# Patient Record
Sex: FEMALE | Race: WHITE
[De-identification: names, ages, dates, MRNs, and addresses within clinical notes are randomized per-mention and may not be internally consistent; named-entity substitution may affect disease eponyms.]

---

## 2017-06-11 ENCOUNTER — HOSPITAL ENCOUNTER (EMERGENCY)
Dept: HOSPITAL 45 - C.EDB | Age: 62
Discharge: HOME | End: 2017-06-11
Payer: COMMERCIAL

## 2017-06-11 VITALS
HEIGHT: 60 IN | BODY MASS INDEX: 23.42 KG/M2 | BODY MASS INDEX: 23.42 KG/M2 | WEIGHT: 119.27 LBS | HEIGHT: 60 IN | WEIGHT: 119.27 LBS

## 2017-06-11 VITALS — HEART RATE: 87 BPM | SYSTOLIC BLOOD PRESSURE: 136 MMHG | OXYGEN SATURATION: 100 % | DIASTOLIC BLOOD PRESSURE: 96 MMHG

## 2017-06-11 VITALS — TEMPERATURE: 98.06 F

## 2017-06-11 DIAGNOSIS — D64.9: ICD-10-CM

## 2017-06-11 DIAGNOSIS — Z79.899: ICD-10-CM

## 2017-06-11 DIAGNOSIS — E03.9: ICD-10-CM

## 2017-06-11 DIAGNOSIS — M35.00: ICD-10-CM

## 2017-06-11 DIAGNOSIS — M54.6: Primary | ICD-10-CM

## 2017-06-11 LAB
ALBUMIN/GLOB SERPL: 1.2 {RATIO} (ref 0.9–2)
ALP SERPL-CCNC: 106 U/L (ref 45–117)
ALT SERPL-CCNC: 27 U/L (ref 12–78)
ANION GAP SERPL CALC-SCNC: 8 MMOL/L (ref 3–11)
AST SERPL-CCNC: 20 U/L (ref 15–37)
BASOPHILS # BLD: 0.02 K/UL (ref 0–0.2)
BASOPHILS NFR BLD: 0.2 %
BUN SERPL-MCNC: 9 MG/DL (ref 7–18)
BUN/CREAT SERPL: 13.6 (ref 10–20)
CALCIUM SERPL-MCNC: 8.6 MG/DL (ref 8.5–10.1)
CHLORIDE SERPL-SCNC: 105 MMOL/L (ref 98–107)
CO2 SERPL-SCNC: 29 MMOL/L (ref 21–32)
COMPLETE: YES
CREAT CL PREDICTED SERPL C-G-VRATE: 61.7 ML/MIN
CREAT SERPL-MCNC: 0.67 MG/DL (ref 0.6–1.2)
EOSINOPHIL NFR BLD AUTO: 264 K/UL (ref 130–400)
GLOBULIN SER-MCNC: 3.1 GM/DL (ref 2.5–4)
GLUCOSE SERPL-MCNC: 85 MG/DL (ref 70–99)
HCT VFR BLD CALC: 34.1 % (ref 37–47)
IG%: 0.1 %
IMM GRANULOCYTES NFR BLD AUTO: 19.3 %
LYMPHOCYTES # BLD: 1.66 K/UL (ref 1.2–3.4)
MCH RBC QN AUTO: 31.9 PG (ref 25–34)
MCHC RBC AUTO-ENTMCNC: 33.7 G/DL (ref 32–36)
MCV RBC AUTO: 94.7 FL (ref 80–100)
MONOCYTES NFR BLD: 9.5 %
NEUTROPHILS # BLD AUTO: 1 %
NEUTROPHILS NFR BLD AUTO: 69.9 %
PMV BLD AUTO: 9.1 FL (ref 7.4–10.4)
POTASSIUM SERPL-SCNC: 3.9 MMOL/L (ref 3.5–5.1)
RBC # BLD AUTO: 3.6 M/UL (ref 4.2–5.4)
SODIUM SERPL-SCNC: 142 MMOL/L (ref 136–145)
WBC # BLD AUTO: 8.61 K/UL (ref 4.8–10.8)

## 2017-06-11 NOTE — EMERGENCY ROOM VISIT NOTE
ED Visit Note


First contact with patient:  11:42


Resident Physician Supervision Note:





I interviewed and examined the patient. Discussed with  and agree with 

findings and plan as documented in the note. Any exceptions or clarifications 

are listed here: [None]





Documented By:  Kash Still

## 2017-06-11 NOTE — DIAGNOSTIC IMAGING REPORT
THORACIC SPINE 3 VIEWS ROUTINE



CLINICAL HISTORY: T9-T11 back pain    



COMPARISON STUDY:  No previous studies for comparison.



FINDINGS: There is a minor spinal curvature convex to the left. There are

multilevel degenerative changes most pronounced in the lower thoracic spine.

There are prominent anterior and lateral osteophytes. No acute fractures or

traumatic subluxations are visualized. There are no destructive lesions.



IMPRESSION:  

1. Moderate multilevel degenerative change

2. No acute fractures. No destructive lesions are visualized. 









Electronically signed by:  Peter Driscoll M.D.

6/11/2017 1:49 PM



Dictated Date/Time:  6/11/2017 1:48 PM

## 2017-06-11 NOTE — DIAGNOSTIC IMAGING REPORT
CHEST 1 VW FRONT-NOT PORTABLE



CLINICAL HISTORY: BACK PAIN HISTORY OF PERICARDIAL EFFUSION.



COMPARISON STUDY:  12/4/2011



FINDINGS: The cardiac and mediastinal contours are normal. There is no evidence

of focal pulmonary consolidation. There is no evidence of failure. No pleural

effusions are visualized.[ 



IMPRESSION: No active disease in the chest.







Electronically signed by:  Peter Driscoll M.D.

6/11/2017 1:48 PM



Dictated Date/Time:  6/11/2017 1:47 PM

## 2017-06-11 NOTE — EMERGENCY ROOM VISIT NOTE
History


First contact with patient:  11:42


Chief Complaint:  BACK PAIN


Stated Complaint:  MID BACK PAIN X 7-10 DAYS,WORSE AT NIGHT





History of Present Illness


The patient is a 62 year old female who presents to the Emergency Room with 

complaints of mid thoracic back pain. This pain has been present for the last 7-

10 days, worse on deep breathing and at night when lying down. Worse at night/

lying down (after getting up from falling asleep on sofa) 7/10, currently 3/10. 

Radiation across back both sides. No acid taste in her mouth. No associated 

shortness of breath, diaphoresis or palpitations. She denies any claudication, 

orthopnea or PND. She denies any precipitating trauma however she has recently 

doubled the amount of time she spends on her exercise bike. Motrin helps with 

the pain, she last took this at 12:30am.





She denies any limb weakness, change in sensation, urinary or bowel 

incontinence.





She denies any leg swelling or family history of DVT/PE. She has osteoporosis 

but no known fractures associated with this and was previously on 

bisphosphonates.





Review of Systems


Chronic dry mouth from Sjogren's.


See HPI for pertinent positives & negatives. A total of 10 systems reviewed and 

were otherwise negative.





Past Medical/Surgical History


Medical Problems:


(1) Hypothyroid


(2) Pericardial effusion


(3) Sjoegren syndrome








Social History


Smoking Status:  Never Smoker


Alcohol Use:  occasionally


Marital Status:  





Current/Historical Medications


Scheduled


Cholecalciferol (Vitamin D3), 1,000 INTER.UNIT PO DAILY


Desipramine HCl (Desipramine HCl), 100 MG PO HS


Fluticasone Propionate (Nasal) (Flonase Allergy Relief), 1 SPRAY NA DAILY


Hydroxychloroquine Sulfate (Plaquenil), 200 MG PO DAILY


Krill Oil (Krill Oil 500 mg), 500 MG PO DAILY


Levothyroxine Sodium (Synthroid), 100 MCG PO DAILY


Loratadine (Claritin), 10 MG PO DAILY


Misc Natural Products (Glucosamine Chondroitin T), 1 TAB PO DAILY


Multiple Minerals W/ Vitamins (Calcium Citrate +), 600 MG PO DAILY


Multivitamin (Multivitamin), 1 TAB PO DAILY





Allergies


Coded Allergies:  


     No Known Allergies (Unverified , 6/11/17)





Physical Exam


Vital Signs











  Date Time  Temp Pulse Resp B/P (MAP) Pulse Ox O2 Delivery O2 Flow Rate FiO2


 


6/11/17 15:43  87 22 136/96 100   


 


6/11/17 13:45  86 20 124/87 100 Room Air  


 


6/11/17 13:17  90 14 158/87 99 Room Air  


 


6/11/17 12:06  105      


 


6/11/17 12:01      Room Air  


 


6/11/17 11:57  97 20 131/91 100 Room Air  


 


6/11/17 11:34 36.7 66 16 136/91 96 Room Air  








Pain Rating (0-10):  0





Physical Exam


VITAL SIGNS: were reviewed as above


GENERAL: no acute distress


SKIN: Warm dry and pink, no rashes


HEAD: Normocephalic and atraumatic


EYES: extraocular muscles intact, pupils equal


OROPHARYNX: non erythematous, clear and moist


NECK: Supple, no adenopathy or meningismus


LUNGS: regular rate, clear to auscultation, no crackles or wheezing, no 

accessory muscle use


HEART: Regular rate and rhythm, heart sounds 1+2, no murmurs, no rubs


ABDOMEN: Soft and nontender, bowel sounds normal


BACK: no CVA tenderness


EXTREMITIES: Warm and well perfused, no calf tenderness/swelling, no pedal 

edema.


NEUROLOGICALLY: Awake alert and oriented. There is no facial droop. Speech is 

clear. Vision is grossly normal. Lower limb power and sensation normal.





Medical Decision & Procedures


ER Provider


Diagnostic Interpretation:


CHEST 1 VW FRONT-NOT PORTABLE





CLINICAL HISTORY: BACK PAIN HISTORY OF PERICARDIAL EFFUSION.





COMPARISON STUDY:  12/4/2011





FINDINGS: The cardiac and mediastinal contours are normal. There is no evidence


of focal pulmonary consolidation. There is no evidence of failure. No pleural


effusions are visualized.[ 





IMPRESSION: No active disease in the chest.





Electronically signed by:  Peter Driscoll M.D.


6/11/2017 1:48 PM





Dictated Date/Time:  6/11/2017 1:47 PM





THORACIC SPINE 3 VIEWS ROUTINE





CLINICAL HISTORY: T9-T11 back pain    





COMPARISON STUDY:  No previous studies for comparison.





FINDINGS: There is a minor spinal curvature convex to the left. There are


multilevel degenerative changes most pronounced in the lower thoracic spine.


There are prominent anterior and lateral osteophytes. No acute fractures or


traumatic subluxations are visualized. There are no destructive lesions.





IMPRESSION:  


1. Moderate multilevel degenerative change


2. No acute fractures. No destructive lesions are visualized. 





Electronically signed by:  Peter Driscoll M.D.


6/11/2017 1:49 PM





Dictated Date/Time:  6/11/2017 1:48 PM





Laboratory Results


6/11/17 12:35








Red Blood Count 3.60, Mean Corpuscular Volume 94.7, Mean Corpuscular Hemoglobin 

31.9, Mean Corpuscular Hemoglobin Concent 33.7, Mean Platelet Volume 9.1, 

Neutrophils (%) (Auto) 69.9, Lymphocytes (%) (Auto) 19.3, Monocytes (%) (Auto) 

9.5, Eosinophils (%) (Auto) 1.0, Basophils (%) (Auto) 0.2, Neutrophils # (Auto) 

6.01, Lymphocytes # (Auto) 1.66, Monocytes # (Auto) 0.82, Eosinophils # (Auto) 

0.09, Basophils # (Auto) 0.02





6/11/17 12:35

















Test


  6/11/17


12:35


 


White Blood Count


  8.61 K/uL


(4.8-10.8)


 


Red Blood Count


  3.60 M/uL


(4.2-5.4)


 


Hemoglobin


  11.5 g/dL


(12.0-16.0)


 


Hematocrit 34.1 % (37-47) 


 


Mean Corpuscular Volume


  94.7 fL


()


 


Mean Corpuscular Hemoglobin


  31.9 pg


(25-34)


 


Mean Corpuscular Hemoglobin


Concent 33.7 g/dl


(32-36)


 


Platelet Count


  264 K/uL


(130-400)


 


Mean Platelet Volume


  9.1 fL


(7.4-10.4)


 


Neutrophils (%) (Auto) 69.9 % 


 


Lymphocytes (%) (Auto) 19.3 % 


 


Monocytes (%) (Auto) 9.5 % 


 


Eosinophils (%) (Auto) 1.0 % 


 


Basophils (%) (Auto) 0.2 % 


 


Neutrophils # (Auto)


  6.01 K/uL


(1.4-6.5)


 


Lymphocytes # (Auto)


  1.66 K/uL


(1.2-3.4)


 


Monocytes # (Auto)


  0.82 K/uL


(0.11-0.59)


 


Eosinophils # (Auto)


  0.09 K/uL


(0-0.5)


 


Basophils # (Auto)


  0.02 K/uL


(0-0.2)


 


RDW Standard Deviation


  44.7 fL


(36.4-46.3)


 


RDW Coefficient of Variation


  12.9 %


(11.5-14.5)


 


Immature Granulocyte % (Auto) 0.1 % 


 


Immature Granulocyte # (Auto)


  0.01 K/uL


(0.00-0.02)


 


Anion Gap


  8.0 mmol/L


(3-11)


 


Est Creatinine Clear Calc


Drug Dose 61.7 ml/min 


 


 


Estimated GFR (


American) 108.4 


 


 


Estimated GFR (Non-


American 93.5 


 


 


BUN/Creatinine Ratio 13.6 (10-20) 


 


Calcium Level


  8.6 mg/dl


(8.5-10.1)


 


Total Bilirubin


  0.6 mg/dl


(0.2-1)


 


Aspartate Amino Transf


(AST/SGOT) 20 U/L (15-37) 


 


 


Alanine Aminotransferase


(ALT/SGPT) 27 U/L (12-78) 


 


 


Alkaline Phosphatase


  106 U/L


()


 


Troponin I


  < 0.015 ng/ml


(0-0.045)


 


Total Protein


  6.8 gm/dl


(6.4-8.2)


 


Albumin


  3.7 gm/dl


(3.4-5.0)


 


Globulin


  3.1 gm/dl


(2.5-4.0)


 


Albumin/Globulin Ratio 1.2 (0.9-2) 


 


Lipase


  129 U/L


()











ECG


Indication:  back/shoulder pain


Rate (beats per minute):  88


Rhythm:  normal sinus


Change:


Comparison EKG 03/31/2012


Mild ST elevation no longer present otherwise appears similar





ED Course


11:45am Complete history and physical performed


12:15pm Discussed with Dr Still who agreed with plana and separately performed 

history and physical


After all imaging and labs were back, the results were discussed with the 

patient. She was reassured and discharged.





Medical Decision


Prior records/ancillary studies reviewed.


Triage Nursing notes reviewed.


Additional history obtained from patient





The patient's history was concerning for back pain.





Differential diagnosis:


Etiologies such as musculoskeletal, disc herniation, fracture, aortic disease, 

metastatic disease, cord compression, discitis, infection, renal colic, 

gastrointestinal, acute exacerbation of chronic back pain, sciatica, cauda 

equina, as well as others were entertained.





Physical findings:


As above.  No focal neurologic findings noted.





ER treatment provided:


On reassessment the patient felt better.





Diagnostics interpreted by me:


The labs revealed mild anemia only





Imaging studies:


moderate degenerative changes of thoracic spine but otherwise unremarkable





This appears to be consistent with [facility] thoracic back pain. The patient's 

physical examination and detailed history did not reveal any red flags for back 

pain such as those listed in the differential diagnosis. Therefore advanced 

diagnostics and consultations were felt to be unwarranted. 





By the evaluation outlined above emergent etiologies such as pericardial 

effusion, fracture, aortic disease, metastatic disease, infection, renal colic, 

gastrointestinal, cord compression, cauda equina, as well as others were deemed 

relatively unlikely.





The patient informed about the findings as listed above and advised to reduce 

her exercise regimen, stretch more and follow up with her PCP. All questions 

were answered and she pleased with the investigation. Return instructions were 

outlined and the patient was discharged in stable condition.





Referral:


The patient was referred back to her primary care physician for follow-up as 

previously arranged in 3 days.





Impression





 Primary Impression:  


 Thoracic back pain


 Additional Impression:  


 Anemia





Departure Information


Dispostion


Home / Self-Care





Condition


GOOD





Referrals


Douglas Cary M.D. (PCP)





Patient Instructions


[facility]





Resident Tracking


Resident Involvement:  Resident Care Provided


Care Provided:  Adult ED





Problem Qualifiers








 Primary Impression:  


 Thoracic back pain


 Chronicity:  acute  Back pain laterality:  bilateral  Qualified Codes:  M54.6 

- Pain in thoracic spine


 Additional Impression:  


 Anemia


 Anemia type:  unspecified type  Qualified Codes:  D64.9 - Anemia, unspecified

## 2017-08-01 ENCOUNTER — HOSPITAL ENCOUNTER (OUTPATIENT)
Dept: HOSPITAL 45 - X.SURG | Age: 62
Discharge: HOME | End: 2017-08-01
Attending: OPHTHALMOLOGY
Payer: COMMERCIAL

## 2017-08-01 VITALS
BODY MASS INDEX: 22.43 KG/M2 | BODY MASS INDEX: 22.43 KG/M2 | HEIGHT: 60 IN | WEIGHT: 114.24 LBS | HEIGHT: 60 IN | WEIGHT: 114.24 LBS

## 2017-08-01 VITALS — TEMPERATURE: 98.6 F

## 2017-08-01 VITALS — OXYGEN SATURATION: 100 % | SYSTOLIC BLOOD PRESSURE: 120 MMHG | DIASTOLIC BLOOD PRESSURE: 84 MMHG | HEART RATE: 78 BPM

## 2017-08-01 DIAGNOSIS — E03.9: ICD-10-CM

## 2017-08-01 DIAGNOSIS — Z82.69: ICD-10-CM

## 2017-08-01 DIAGNOSIS — F50.81: ICD-10-CM

## 2017-08-01 DIAGNOSIS — Z82.5: ICD-10-CM

## 2017-08-01 DIAGNOSIS — M19.90: ICD-10-CM

## 2017-08-01 DIAGNOSIS — F32.9: ICD-10-CM

## 2017-08-01 DIAGNOSIS — M35.00: ICD-10-CM

## 2017-08-01 DIAGNOSIS — Z92.29: ICD-10-CM

## 2017-08-01 DIAGNOSIS — M81.0: ICD-10-CM

## 2017-08-01 DIAGNOSIS — H26.9: Primary | ICD-10-CM

## 2017-08-01 RX ADMIN — CYCLOPENTOLATE HYDROCHLORIDE SCH DROP: 10 SOLUTION/ DROPS OPHTHALMIC at 07:45

## 2017-08-01 RX ADMIN — TROPICAMIDE SCH DROP: 10 SOLUTION/ DROPS OPHTHALMIC at 07:46

## 2017-08-01 RX ADMIN — CYCLOPENTOLATE HYDROCHLORIDE SCH DROP: 10 SOLUTION/ DROPS OPHTHALMIC at 07:41

## 2017-08-01 RX ADMIN — KETOROLAC TROMETHAMINE SCH DROP: 5 SOLUTION OPHTHALMIC at 07:47

## 2017-08-01 RX ADMIN — PHENYLEPHRINE HYDROCHLORIDE SCH DROP: 25 SOLUTION/ DROPS OPHTHALMIC at 07:36

## 2017-08-01 RX ADMIN — KETOROLAC TROMETHAMINE SCH DROP: 5 SOLUTION OPHTHALMIC at 07:42

## 2017-08-01 RX ADMIN — GATIFLOXACIN SCH DROP: 5 SOLUTION/ DROPS OPHTHALMIC at 07:43

## 2017-08-01 RX ADMIN — GATIFLOXACIN SCH DROP: 5 SOLUTION/ DROPS OPHTHALMIC at 07:53

## 2017-08-01 RX ADMIN — PHENYLEPHRINE HYDROCHLORIDE SCH DROP: 25 SOLUTION/ DROPS OPHTHALMIC at 07:44

## 2017-08-01 RX ADMIN — TROPICAMIDE SCH DROP: 10 SOLUTION/ DROPS OPHTHALMIC at 07:40

## 2017-08-01 NOTE — [facility] OPERATIVE REPORT
Operative Report


Date of Service


Aug 1, 2017.





Operative Report


1.  PREOPERATIVE DIAGNOSIS:  Cataract of the left eye.





2.  POSTOPERATIVE DIAGNOSIS:  Same.





3.  PROCEDURE:  Phacoemulsification with intraocular lens implantation of the 

left eye.  





SURGEON:  Dr. Matthias Mott.  ANESTHESIA:  Topical Lidocaine gel, 1% Non-

Preserved intracameral Lidocaine, and monitored intravenous sedation.  





INDICATIONS FOR THE PROCEDURE:  The patient is a 62 - year-old female with a 

history of cataract of the left eye causing significant visual impairment.  The 

details of the proposed procedure were explained to the patient who asked 

appropriate questions and following discussion of all risks, benefits and 

alternatives agreed to have the procedure done.  Patient had corneal 

astigmatism and therefore elected to have a toric lens placed.





4.  OPERATION AND FINDINGS:  





DESCRIPTION OF PROCEDURE:  After informed consent was obtained,patient was 

placed in an upright position and the cornea was marked at 62 degrees using the 

[employer] corneal marking tool. The the patient was brought to the Operating Room 

at the [facility].  The patient was placed in a supine 

position and then the left eye was prepped and draped in the usual sterile 

fashion for intraocular surgery.  A drop of topical Lidocaine gel was placed in 

the operative eye.  A wire lid speculum was then placed in the fornices.  A 

corneal paracentesis was then created temporally.  The Non-Preserved Lidocaine 

was then instilled into the anterior chamber.  The anterior chamber was then 

pressurized with viscoelastic.  A 2.0 mm clear corneal incision was then 

created temporally.  A cystotome was inserted into the anterior chamber and 

used to create a tear in the anterior lens capsule.  This capsular tear was 

then used to create a small flap and the flap was dragged in a counterclockwise 

direction in order to create a continuous curvilinear capsulorrhexis.  

Hydrodissection was accomplished with balanced salt solution.  

Phacoemulsification of the lens nucleus was then performed in a standard divide-

and-conquer technique.  The phaco time was 19 seconds with an average power of 

7 %.  The remaining cortical material was removed using irrigation aspiration.  

The capsular bag was then filled with viscoelastic.  A Priyank [id number] +16.0 

diopters lens was then loaded into the injector and injected into the capsular 

bag. The lens was aligned with the previously made corneal marks. The remaining 

viscoelastic was removed with the irrigation aspiration handpiece.  The wound 

was hydrated and then checked and found to be watertight.  The intraocular 

pressure was checked and found to be adequate.  The wire lid speculum was 

removed and the patient's face was cleaned and dried.  TobraDex ointment was 

placed in the inferior fornix.  The patient was discharged to the Recovery Room 

having tolerated the procedure well.  There were no complications.  The patient 

will be seen tomorrow in the office for follow-up.


I attest to the content of the Intraoperative Record and any orders documented 

therein.  Any exceptions are noted below.

## 2017-08-01 NOTE — HISTORY & PHYSICAL BRIDGE - [facility]
H&P Re-Evaluation


Bridge Note:


I have examined the patient, reviewed the History & Physical and in the 

interval since the performance of the History & Physical I have noted the 

following changes of clinical significance:


Diagnosis: Left Cataract





Procedure:  Left Cataract Removal with Lens Implant











 No changes noted

## 2017-08-01 NOTE — DISCHARGE INSTRUCTIONS-SURGCTR
Discharge Instructions


Date of Service


Aug 1, 2017.





Visit


Reason for Visit:  Left Cataract





Discharge


Discharge Diagnosis / Problem:  cataract





Discharge Goals


Goal(s):  Improve function





Activity Recommendations


Activity Limitations:  per Instructions/Follow-up section





Anesthesia


.





Post Anesthesia Instructions:





If you have had General Anesthesia or IV Sedation:





*  Do not drive today.


*  Resume driving when surgeon permits.


*  Do not make important decisions or sign legal documents today.


*  Call surgeon for:





   1.  Temperature elevations greater than 101 degrees F.


   2.  Uncontrollable pain.


   3.  Excessive bleeding.


   4.  Persistent nausea and vomiting.


   5.  Medication intolerance (nausea, vomiting or rash).





*  For nausea and vomiting use only clear liquids such as: tea, soda, bouillon 

until nausea subsides, then gradually increase diet as tolerated.





*  If you have any concerns or questions, call your surgeon's office.  If 

physician is unavailable and it is an emergency, call 911 or go to the nearest 

emergency room.





.





Instructions / Follow-Up


Instructions / Follow-Up





ACTIVITY RECOMMENDATIONS:





*  No strenuous lifting, jogging or running for 4 days





*  No swimming or yard work for 1 week.





*  Limited bending is permitted, such as putting on shoes.








RETURN TO SCHOOL/WORK:





No work until seen by physician in office.








MEDICATIONS:





Resume previous medications unless instructed otherwise by your surgeon.  This


includes eye drops for glaucoma.





Zymaxid/Gatifloxacin (tan cap) - one drop every 2 hours until bedtime





Nevanac/Ilevro/Prolensa/Ketorolac (gray cap) - one drop every 4 hours until 

bedtime





Prednisolone/Durezol (white/pink cap, SHAKE WELL) - one drop every 2 hours 

until bedtime





Starting tomorrow - all 3 drops every 4 hours until seen in the office





Optive drops - as needed for discomfort








SPECIAL CARE INSTRUCTIONS:





*  Wear eyeshield when sleeping, for four nights.





*  You may wear your own glasses or sunglasses while awake.





*  You may read or watch TV





*  You may shower and wash your face, but be gentle around the eye and pat dry.





*  Blurry vision and mild irritation are normal.





*  Call office if pain is more severe or vision becomes dark at [phone].








FOLLOW UP VISIT:





Follow-up with Dr Mott tomorrow.





Diet Recommendations


Home Diet:  resume previous diet





Procedures


Procedures Performed:  


Left Cataract Phacoemulsification With Intraocular Lens Implant; Toric Lens





Pending Studies


Studies pending at discharge:  no





Medical Emergencies








.


Who to Call and When:





Medical Emergencies:  If at any time you feel your situation is an emergency, 

please call 911 immediately.





.





Non-Emergent Contact


Non-Emergency issues call your:  Ophthalmologist





.


.








"Provider Documentation" section prepared by Matthias Mott.








.

## 2017-08-01 NOTE — ANESTHESIA PROGRESS NT - [facility]
Anesthesia Post Op Note


Date & Time


Aug 1, 2017 at 09:05





Vital Signs


Pain Intensity:  0





Vital Signs Past 12 Hours








  Date Time  Temp Pulse Resp B/P (MAP) Pulse Ox O2 Delivery O2 Flow Rate FiO2


 


8/1/17 08:57 37 77 16 121/80 (94) 100 Room Air  


 


8/1/17 07:28 36.6 90 16 122/85 (97) 100 Room Air  











Notes


Mental Status:  alert / awake / arousable, participated in evaluation


Pt Amnestic to Procedure:  Yes


Nausea / Vomiting:  adequately controlled


Pain:  adequately controlled


Airway Patency, RR, SpO2:  stable & adequate


BP & HR:  stable & adequate


Hydration State:  stable & adequate


Anesthetic Complications:  no major complications apparent

## 2017-08-22 ENCOUNTER — HOSPITAL ENCOUNTER (OUTPATIENT)
Dept: HOSPITAL 45 - X.SURG | Age: 62
Discharge: HOME | End: 2017-08-22
Attending: OPHTHALMOLOGY
Payer: COMMERCIAL

## 2017-08-22 VITALS — TEMPERATURE: 97.88 F

## 2017-08-22 VITALS
HEIGHT: 60 IN | HEIGHT: 60 IN | WEIGHT: 114.24 LBS | BODY MASS INDEX: 22.43 KG/M2 | WEIGHT: 114.24 LBS | BODY MASS INDEX: 22.43 KG/M2

## 2017-08-22 VITALS — OXYGEN SATURATION: 98 % | HEART RATE: 79 BPM | DIASTOLIC BLOOD PRESSURE: 75 MMHG | SYSTOLIC BLOOD PRESSURE: 109 MMHG

## 2017-08-22 DIAGNOSIS — E03.9: ICD-10-CM

## 2017-08-22 DIAGNOSIS — F32.9: ICD-10-CM

## 2017-08-22 DIAGNOSIS — Z82.5: ICD-10-CM

## 2017-08-22 DIAGNOSIS — F50.81: ICD-10-CM

## 2017-08-22 DIAGNOSIS — M81.0: ICD-10-CM

## 2017-08-22 DIAGNOSIS — H26.9: Primary | ICD-10-CM

## 2017-08-22 DIAGNOSIS — M19.90: ICD-10-CM

## 2017-08-22 RX ADMIN — PHENYLEPHRINE HYDROCHLORIDE SCH DROP: 25 SOLUTION/ DROPS OPHTHALMIC at 09:41

## 2017-08-22 RX ADMIN — CYCLOPENTOLATE HYDROCHLORIDE SCH DROP: 10 SOLUTION/ DROPS OPHTHALMIC at 09:48

## 2017-08-22 RX ADMIN — KETOROLAC TROMETHAMINE SCH DROP: 5 SOLUTION OPHTHALMIC at 09:44

## 2017-08-22 RX ADMIN — GATIFLOXACIN SCH DROP: 5 SOLUTION/ DROPS OPHTHALMIC at 09:57

## 2017-08-22 RX ADMIN — PHENYLEPHRINE HYDROCHLORIDE SCH DROP: 25 SOLUTION/ DROPS OPHTHALMIC at 09:46

## 2017-08-22 RX ADMIN — TROPICAMIDE SCH DROP: 10 SOLUTION/ DROPS OPHTHALMIC at 09:47

## 2017-08-22 RX ADMIN — CYCLOPENTOLATE HYDROCHLORIDE SCH DROP: 10 SOLUTION/ DROPS OPHTHALMIC at 09:43

## 2017-08-22 RX ADMIN — GATIFLOXACIN SCH DROP: 5 SOLUTION/ DROPS OPHTHALMIC at 09:45

## 2017-08-22 RX ADMIN — TROPICAMIDE SCH DROP: 10 SOLUTION/ DROPS OPHTHALMIC at 09:42

## 2017-08-22 RX ADMIN — KETOROLAC TROMETHAMINE SCH DROP: 5 SOLUTION OPHTHALMIC at 09:49

## 2017-08-22 NOTE — DISCHARGE INSTRUCTIONS-SURGCTR
Discharge Instructions


Date of Service


Aug 22, 2017.





Visit


Reason for Visit:  Cataract Right Eye





Discharge


Discharge Diagnosis / Problem:  cataract





Discharge Goals


Goal(s):  Improve function





Activity Recommendations


Activity Limitations:  per Instructions/Follow-up section





Anesthesia


.





Post Anesthesia Instructions:





If you have had General Anesthesia or IV Sedation:





*  Do not drive today.


*  Resume driving when surgeon permits.


*  Do not make important decisions or sign legal documents today.


*  Call surgeon for:





   1.  Temperature elevations greater than 101 degrees F.


   2.  Uncontrollable pain.


   3.  Excessive bleeding.


   4.  Persistent nausea and vomiting.


   5.  Medication intolerance (nausea, vomiting or rash).





*  For nausea and vomiting use only clear liquids such as: tea, soda, bouillon 

until nausea subsides, then gradually increase diet as tolerated.





*  If you have any concerns or questions, call your surgeon's office.  If 

physician is unavailable and it is an emergency, call 911 or go to the nearest 

emergency room.





.





Instructions / Follow-Up


Instructions / Follow-Up





ACTIVITY RECOMMENDATIONS:





*  No strenuous lifting, jogging or running for 4 days





*  No swimming or yard work for 1 week.





*  Limited bending is permitted, such as putting on shoes.








RETURN TO SCHOOL/WORK:





No work until seen by physician in office.








MEDICATIONS:





Resume previous medications unless instructed otherwise by your surgeon.  This


includes eye drops for glaucoma.





Zymaxid/Gatifloxacin (tan cap) - one drop every 2 hours until bedtime





Nevanac/Ilevro/Prolensa/Ketorolac (gray cap) - one drop every 4 hours until 

bedtime





Prednisolone/Durezol (white/pink cap, SHAKE WELL) - one drop every 2 hours 

until bedtime





Starting tomorrow - all 3 drops every 4 hours until seen in the office





Optive drops - as needed for discomfort








SPECIAL CARE INSTRUCTIONS:





*  Wear eyeshield when sleeping, for four nights.





*  You may wear your own glasses or sunglasses while awake.





*  You may read or watch TV





*  You may shower and wash your face, but be gentle around the eye and pat dry.





*  Blurry vision and mild irritation are normal.





*  Call office if pain is more severe or vision becomes dark at [phone].








FOLLOW UP VISIT:





Follow-up with Dr Mott tomorrow.





Diet Recommendations


Home Diet:  resume previous diet





Procedures


Procedures Performed:  


Right Cataract Phacoemulsification With Intraocular Lens Implant





Pending Studies


Studies pending at discharge:  no





Medical Emergencies








.


Who to Call and When:





Medical Emergencies:  If at any time you feel your situation is an emergency, 

please call 911 immediately.





.





Non-Emergent Contact


Non-Emergency issues call your:  Ophthalmologist





.


.








"Provider Documentation" section prepared by Matthias Mott.








.

## 2017-08-22 NOTE — HISTORY & PHYSICAL BRIDGE - [facility]
H&P Re-Evaluation


Bridge Note:


I have examined the patient, reviewed the History & Physical and in the 

interval since the performance of the History & Physical I have noted the 

following changes of clinical significance:





Diagnosis: Right Cataract





Procedure:  Right Cataract Removal with Lens Implant





 No changes noted

## 2017-08-22 NOTE — ANESTHESIA PROGRESS NT - [facility]
Anesthesia Post Op Note


Date & Time


Aug 22, 2017 at 11:23





Vital Signs


Pain Intensity:  4





Vital Signs Past 12 Hours








  Date Time  Temp Pulse Resp B/P (MAP) Pulse Ox O2 Delivery O2 Flow Rate FiO2


 


8/22/17 11:17  79 16 109/75 (86) 98 Room Air  


 


8/22/17 11:04 36.6 71 16 112/79 (90) 100 Room Air  


 


8/22/17 09:33 36.8 77 20 119/82 (94) 100 Room Air  











Notes


Mental Status:  alert / awake / arousable, participated in evaluation


Pt Amnestic to Procedure:  Yes


Nausea / Vomiting:  adequately controlled


Pain:  adequately controlled


Airway Patency, RR, SpO2:  stable & adequate


BP & HR:  stable & adequate


Hydration State:  stable & adequate


Anesthetic Complications:  no major complications apparent

## 2017-08-22 NOTE — [facility] OPERATIVE REPORT
Operative Report


Date of Service


Aug 22, 2017.





Operative Report


1.  PREOPERATIVE DIAGNOSIS:  Cataract of the right eye.





2.  POSTOPERATIVE DIAGNOSIS:  Same.





3.  PROCEDURE:  Phacoemulsification with intraocular lens implantation of the 

right eye.  





SURGEON:  Dr. Matthias Mott.  ANESTHESIA:  Topical Lidocaine gel, 1% Non-

Preserved intracameral Lidocaine, and monitored intravenous sedation.  





INDICATIONS FOR THE PROCEDURE:  The patient is a 62 - year-old female with a 

history of cataract of the right eye causing significant visual impairment.  

The details of the proposed procedure were explained to the patient who asked 

appropriate questions and following discussion of all risks, benefits and 

alternatives agreed to have the procedure done.  Patient had corneal 

astigmatism and therefore elected to have a toric lens placed. 





4.  OPERATION AND FINDINGS:  





DESCRIPTION OF PROCEDURE:  After informed consent was obtained, patient was 

placed in an upright position and the cornea was marked at <> degrees using the 

PlaceFirst corneal marking tool. The the patient was brought to the Operating Room 

at the [facility].  The patient was placed in a supine 

position and then the right eye was prepped and draped in the usual sterile 

fashion for intraocular surgery.  A drop of topical Lidocaine gel was placed in 

the operative eye.  A wire lid speculum was then placed in the fornices.  A 

corneal paracentesis was then created temporally.  The Non-Preserved Lidocaine 

was then instilled into the anterior chamber.  The anterior chamber was then 

pressurized with viscoelastic.  A 2.0 mm clear corneal incision was then 

created temporally.  A cystotome was inserted into the anterior chamber and 

used to create a tear in the anterior lens capsule.  This capsular tear was 

then used to create a small flap and the flap was dragged in a counterclockwise 

direction in order to create a continuous curvilinear capsulorrhexis.  

Hydrodissection was accomplished with balanced salt solution.  

Phacoemulsification of the lens nucleus was then performed in a standard divide-

and-conquer technique.  The phaco time was 18 seconds with an average power of 

12 %.  The remaining cortical material was removed using irrigation aspiration.

  The capsular bag was then filled with viscoelastic.  A Priyank [id number] +17.5 

diopters lens was then loaded into the injector and injected into the capsular 

bag. The lens was aligned with the previously made corneal marks. The remaining 

viscoelastic was removed with the irrigation aspiration handpiece.  The wound 

was hydrated and then checked and found to be watertight.  The intraocular 

pressure was checked and found to be adequate.  The wire lid speculum was 

removed and the patient's face was cleaned and dried.  TobraDex ointment was 

placed in the inferior fornix.  The patient was discharged to the Recovery Room 

having tolerated the procedure well.  There were no complications.  The patient 

will be seen tomorrow in the office for follow-up.


I attest to the content of the Intraoperative Record and any orders documented 

therein.  Any exceptions are noted below.

## 2017-11-28 ENCOUNTER — HOSPITAL ENCOUNTER (OUTPATIENT)
Dept: HOSPITAL 45 - C.LABPVFM | Age: 62
Discharge: HOME | End: 2017-11-28
Attending: NURSE PRACTITIONER
Payer: COMMERCIAL

## 2017-11-28 DIAGNOSIS — Z79.899: Primary | ICD-10-CM

## 2017-12-04 ENCOUNTER — HOSPITAL ENCOUNTER (OUTPATIENT)
Dept: HOSPITAL 45 - C.MAMM | Age: 62
Discharge: HOME | End: 2017-12-04
Attending: OBSTETRICS & GYNECOLOGY
Payer: COMMERCIAL

## 2017-12-04 DIAGNOSIS — Z12.31: Primary | ICD-10-CM

## 2017-12-04 DIAGNOSIS — N64.89: ICD-10-CM

## 2017-12-05 NOTE — MAMMOGRAPHY REPORT
BILATERAL DIGITAL SCREENING MAMMOGRAM TOMOSYNTHESIS WITH CAD: 12/4/2017

CLINICAL HISTORY: Routine screening.  Patient has no complaints.  





TECHNIQUE:  Breast tomosynthesis in addition to standard 2D mammography was performed. Current study 
was also evaluated with a Computer Aided Detection (CAD) system.  



COMPARISON: Comparison is made to exams dated:  12/1/2016 mammogram, 11/30/2015 mammogram, 11/25/2014
 mammogram, 10/16/2013 mammogram, 10/8/2012 mammogram, and 10/5/2011 mammogram - [facility].   



BREAST COMPOSITION:  There are scattered areas of fibroglandular density in both breasts.  



FINDINGS:  There is a 6 mm nodular asymmetry in the lateral, middle to posterior left breast, only se
en on the CC view.  Additional spot compression tomosynthesis views and possible ultrasound are recom
mended.



There are minimal vascular calcifications in the breasts. No other suspicious mass, architectural dis
tortion or cluster of microcalcifications is seen.  



IMPRESSION:  ACR BI-RADS CATEGORY 0: INCOMPLETE EVALUATION:  NEED ADDITIONAL IMAGING EVALUATION

The 6 mm nodular asymmetry in the lateral left breast needs additional evaluation.  

The patient will be called to schedule an appointment.  





Approximately 10% of breast cancers are not detected with mammography. A negative mammographic report
 should not delay biopsy if a clinically suggestive mass is present.



Megan Duffy M.D.          

ay/:12/4/2017 16:48:45  



Imaging Technologist: Tanesha DOUGLASS)(KARSTEN), [facility]

letter sent: Addl Imaging 0  

BI-RADS Code: ACR BI-RADS Category 0: Incomplete Evaluation:  Need Additional Imaging Evaluation

## 2017-12-07 ENCOUNTER — HOSPITAL ENCOUNTER (OUTPATIENT)
Dept: HOSPITAL 45 - C.MAMM | Age: 62
Discharge: HOME | End: 2017-12-07
Attending: OBSTETRICS & GYNECOLOGY
Payer: COMMERCIAL

## 2017-12-07 DIAGNOSIS — R92.2: Primary | ICD-10-CM

## 2017-12-07 NOTE — MAMMOGRAPHY REPORT
THIS REPORT HAS BEEN AMENDED.  

UNILATERAL LEFT DIGITAL DIAGNOSTIC MAMMOGRAM TOMOSYNTHESIS AND TARGETED LEFT ULTRASOUND: 12/7/2017

CLINICAL HISTORY: Callback from screening mammogram for left breast asymmetry.  





TECHNIQUE:  Breast tomosynthesis in addition to standard 2D mammography was performed.  Spot compress
ion left CC and MLO 2-D and tomosynthesis images were obtained.



COMPARISON: Comparison is made to exams dated:  12/4/2017 mammogram, 12/1/2016 mammogram, 11/30/2015 
mammogram, 11/25/2014 mammogram, 10/16/2013 mammogram, and 10/8/2012 mammogram - [facility].   



BREAST COMPOSITION:  There are scattered areas of fibroglandular density in the left breast.  



FINDINGS: The additional spot compression views demonstrate a round mass with predominantly obscured 
margins measuring 7 mm in the left superior breast middle depth at approximately 12:00.



Targeted ultrasound was performed of the region of the mammographic mass in the left superior breast 
at approximately 12:00.  In the left breast at 11:30, 3 cm from the nipple, there is an oval circumsc
ribed nearly anechoic mass with multiple thin internal septations, measuring 6 x 3 x 4 mm.  This chris
esponds with the mammographic mass and likely represents a complicated cyst, however, a papillary les
ion is not excluded.  Recommend ultrasound-guided core needle biopsy for further evaluation.







IMPRESSION:  ACR BI-RADS CATEGORY 4: SUSPICIOUS, TARGETED ULTRASOUND ACR BI-RADS CATEGORY 4: SUSPICIO
US 

Circumscribed 6 mm mass in the left breast at 11:30 on ultrasound, which corresponds with the mammogr
aphic mass.  The mass likely represents a complicated cyst, however, a solid mass such as a papilloma
 cannot be excluded.  Recommend ultrasound-guided core needle biopsy for further evaluation.



A phone call was made to the physician's office to confirm faxed results were received.  The patient 
has been verbally notified of the results.  She tentatively scheduled the biopsy before leaving the [facility].





Approximately 10% of breast cancers are not detected with mammography. A negative mammographic report
 should not delay biopsy if a clinically suggestive mass is present.



July Solano M.D.          

/:12/7/2017 14:26:15  



Imaging Technologist: Kiara Gross, [facility]

letter sent: Abnormal 4/5  

BI-RADS Code: ACR BI-RADS Category 4: Suspicious  Ultrasound BI-RADS: ACR BI-RADS Category 4: Suspici
ous   





AMENDMENT: 12/8/2017   July Solano M.D. 

The patient called the breast center on 12/8/2017 and I spoke with her directly over the telephone.  
The patient would prefer short interval follow-up imaging of the left breast mass as opposed to biops
y at this time.  This seems reasonable given that the mass most likely represents a cyst.  Therefore,
 recommend follow-up diagnostic tomosynthesis mammograms and ultrasound of the left breast in 6 month
s to reevaluate.



The patient tentatively scheduled the follow-up appointment over the telephone on 12/8/2017.



Amended BI-RADS: ACR BI-RADS Category 4: Suspicious

## 2018-04-12 ENCOUNTER — HOSPITAL ENCOUNTER (OUTPATIENT)
Dept: HOSPITAL 45 - C.LABPVFM | Age: 63
Discharge: HOME | End: 2018-04-12
Attending: PHYSICIAN ASSISTANT
Payer: COMMERCIAL

## 2018-04-12 DIAGNOSIS — M35.00: Primary | ICD-10-CM

## 2018-04-12 DIAGNOSIS — Z79.899: ICD-10-CM

## 2018-04-12 DIAGNOSIS — M81.0: ICD-10-CM

## 2018-04-12 LAB
ALBUMIN SERPL-MCNC: 4.2 GM/DL (ref 3.4–5)
ALP SERPL-CCNC: 112 U/L (ref 45–117)
ALT SERPL-CCNC: 45 U/L (ref 12–78)
AST SERPL-CCNC: 24 U/L (ref 15–37)
CALCIUM SERPL-MCNC: 8.9 MG/DL (ref 8.5–10.1)
CREAT SERPL-MCNC: 0.85 MG/DL (ref 0.6–1.2)
EOSINOPHIL NFR BLD AUTO: 265 K/UL (ref 130–400)
HCT VFR BLD CALC: 38.9 % (ref 37–47)
HGB BLD-MCNC: 12.8 G/DL (ref 12–16)
MCH RBC QN AUTO: 31.5 PG (ref 25–34)
MCHC RBC AUTO-ENTMCNC: 32.9 G/DL (ref 32–36)
MCV RBC AUTO: 95.8 FL (ref 80–100)
PMV BLD AUTO: 9.7 FL (ref 7.4–10.4)
PROT SERPL-MCNC: 7.3 GM/DL (ref 6.4–8.2)
RED CELL DISTRIBUTION WIDTH CV: 13.1 % (ref 11.5–14.5)
RED CELL DISTRIBUTION WIDTH SD: 45.4 FL (ref 36.4–46.3)
WBC # BLD AUTO: 8.11 K/UL (ref 4.8–10.8)

## 2018-05-11 ENCOUNTER — HOSPITAL ENCOUNTER (OUTPATIENT)
Dept: HOSPITAL 45 - C.PAPS | Age: 63
Discharge: HOME | End: 2018-05-11
Attending: OBSTETRICS & GYNECOLOGY
Payer: COMMERCIAL

## 2018-05-11 DIAGNOSIS — Z01.419: Primary | ICD-10-CM

## 2018-05-22 ENCOUNTER — HOSPITAL ENCOUNTER (OUTPATIENT)
Dept: HOSPITAL 45 - C.MAMM | Age: 63
Discharge: HOME | End: 2018-05-22
Attending: PHYSICIAN ASSISTANT
Payer: COMMERCIAL

## 2018-05-22 DIAGNOSIS — M85.80: Primary | ICD-10-CM

## 2018-05-23 ENCOUNTER — HOSPITAL ENCOUNTER (EMERGENCY)
Dept: HOSPITAL 45 - C.EDB | Age: 63
Discharge: HOME | End: 2018-05-23
Payer: COMMERCIAL

## 2018-05-23 VITALS
WEIGHT: 123.9 LBS | HEIGHT: 59.02 IN | BODY MASS INDEX: 24.98 KG/M2 | HEIGHT: 59.02 IN | WEIGHT: 123.9 LBS | BODY MASS INDEX: 24.98 KG/M2

## 2018-05-23 VITALS — SYSTOLIC BLOOD PRESSURE: 133 MMHG | DIASTOLIC BLOOD PRESSURE: 87 MMHG | OXYGEN SATURATION: 100 % | HEART RATE: 75 BPM

## 2018-05-23 VITALS — TEMPERATURE: 97.7 F

## 2018-05-23 DIAGNOSIS — S62.652A: ICD-10-CM

## 2018-05-23 DIAGNOSIS — M35.00: ICD-10-CM

## 2018-05-23 DIAGNOSIS — Z79.899: ICD-10-CM

## 2018-05-23 DIAGNOSIS — S00.81XA: Primary | ICD-10-CM

## 2018-05-23 DIAGNOSIS — E03.9: ICD-10-CM
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
## 2018-05-23 NOTE — EMERGENCY ROOM VISIT NOTE
History


Report prepared by Nima:  Candie Ruiz


Under the Supervision of:  Dr. Aliza Apple D.O.


First contact with patient:  02:34


Chief Complaint:  HEAD INJURY (MINOR)


Stated Complaint:  HIT HEAD, HEAD AND NECK PAIN





History of Present Illness


The patient is a 63 year old female who presents to the Emergency Room with 

complaints of an episode of a head injury occurring prior to arrival. The 

patient states that she sleep walks or has a night terror every few weeks. She 

reports that tonight she fell asleep on the couch. She states that she woke up 

and went to bed. She reports that after an hour of sleeping in her bed, she was 

dreaming that someone was in the room. She states that they gave her something 

and she accidentally dropped it. She states that in her dream she bent down to 

get it. The patient states that at this time she hit her head on the wooden 

foot of the bed and stubbed her finger. The patient complains that her head, 

her finger, and the back of her neck down across her shoulders are in pain. She 

notes that she placed ice on her head and finger on the way here. She states 

that she is slightly nauseous. The patient denies dizziness, lightheadedness, 

blurry vision, numbness, and the use of blood thinners. The patient's  

denies the patient being unsteady on her feet following the incident.





   Source of History:  patient


   Onset:  prior to arrival


   Position:  head


   Quality:  other (injury)


   Timing:  other (episode)


   Associated Symptoms:  + headache, + neck pain, + nausea, No numbness


Note:


The patient complains of finger pain. The patient denies dizziness, 

lightheadedness, blurry vision, and the use of blood thinners.





Review of Systems


See HPI for pertinent positives & negatives. A total of 10 systems reviewed and 

were otherwise negative.





Past Medical & Surgical


Medical Problems:


(1) Hypothyroid


(2) Pericardial effusion


(3) Sjoegren syndrome








Family History





No pertinent family history





Social History


Smoking Status:  Never Smoker


Alcohol Use:  occasionally


Marital Status:  


Housing Status:  lives with significant other





Current/Historical Medications


Scheduled


Desipramine Hcl (Norpramin), 100 MG PO HS


Glucosamine-Chondroitin-Vit C- (Glucosamine Chondroitin), 1 TAB PO DAILY 

AFTERNOON


Hydroxychloroquine Sulfate (Plaquenil), 200 MG PO QAM


Levothyroxine Sodium (Levothyroxine Sodium), 1 TAB PO QAM


Loratadine (Claritin), 10 MG PO QAM


Multiple Minerals W/ Vitamins (Calcium Citrate +), 1 TAB PO BID


Multivitamin (Multivitamin), 1 TAB PO DAILY AFTERNOON





Allergies


Coded Allergies:  


     No Known Allergies (Unverified , 5/23/18)





Physical Exam


Vital Signs











  Date Time  Temp Pulse Resp B/P (MAP) Pulse Ox O2 Delivery O2 Flow Rate FiO2


 


5/23/18 05:30  75 17 133/87 100   


 


5/23/18 04:33  79 16 119/83 99 Room Air  


 


5/23/18 03:50  79 16 119/82 99 Room Air  


 


5/23/18 02:31 36.5 87 16 135/89 99 Room Air  











Physical Exam


GENERAL: alert, well appearing, well nourished, no distress, non-toxic 


HEAD: normal cephalic, large abrasion across forehead.


EYE EXAM: normal conjunctiva, PERRL and EOM's grossly intact


OROPHARYNX: no exudate, no erythema, lips, buccal mucosa, and tongue normal and 

mucous membranes are moist


EARS: TMs clear b/l 


NECK: supple, no nuchal rigidity, no adenopathy, non-tender


CHEST: stable to compression anteriorly and posteriorly 


LUNGS: clear to auscultation. Normal chest wall mechanics, no w/r/r


HEART: no murmurs, S1 normal and S2 normal 


ABDOMEN: abdomen soft, non-tender, normo-active bowel sounds, no masses, no 

rebound or guarding. 


PELVIS: stable to compression anteriorly and posteriorly 


BACK: Back is symmetrical on inspection and there is no deformity, no midline 

tenderness, no CVA tenderness. 


UPPER EXTREMITIES: Mild edema and tenderness to palpation at the right third 

PIP. No obvious deformity.  Mildly decreased ROM secondary to pain.  No 

ecchymosis. Full active and passive range of motion of all other joints without 

tenderness to palpation. 


LOWER EXTREMITIES: full active and passive range of motion of all joints 

without tenderness to palpation 


NEURO EXAM: Normal sensorium, cranial nerves II-XII grossly intact, normal 

speech,  no gross weakness of arms, no gross weakness of legs. GCS: 15.





Medical Decision & Procedures


ER Provider


Diagnostic Interpretation:


Radiology results have been interpreted by the radiologist and reviewed by me.





R FINGER X-RAY:





The results were interpreted by me. Small avulsion fracture in the third digit. 





CT HEAD:





No intracranial hemorrhage, extra-axial fluid, midline shift or mass effect. No 

skull fracture.





Questionable right frontal soft tissue swelling.





Fluid posterior right maxillary sinus.





Radiologist: Akira Galaviz MD





Study ready at 03:28 and initial results transmitted at 04:00.





CT C SPINE:





No acute traumatic injury or abnormal alignment of the cervical spine.





There is cortical disruption of the spinous process at T1. There is an 

elongated spinous process at C7 with similar disruption at its mid section. (

Sagittal reformatted image 55). There is mild marginal irregularity at the 

disruption site with questionable early callus formation or nonunion appearance 

suggesting an indeterminate age. Please correlate with location of patient's 

pain. The lamina and posterior elements are otherwise intact without acute 

process.





Probable intraosseous hemangioma C3. This is a presumed incidental finding.





2 mm anterolisthesis of C2 on C3 is presumed degenerative. Disc space narrowing 

with marginal hypertrophic osteophyte formation noted at C4-5 and C5-6 levels.





Radiologist: Akira Galaviz MD





Study ready at 03:28 and initial results transmitted at 04:00.





ED Course


0240: The patient was evaluated in room B7. A complete history and physical 

exam was performed. 





0424: Upon reevaluation, the patient is feeling better. I discussed the 

findings and the treatment plan with the patient.  She verbalizes agreement and 

understanding.  The patient was discharged home.





Medical Decision


Differential diagnoses include major intracranial, cervical, spinal, thoracic, 

abdominal, pelvic and neurologic injury.  Fracture, contusion, sprain, strain, 

laceration, abrasions included as well. 








Patient well-appearing here.  Discussed with her precautions for close head 

injury and concussions.  Discussed follow-up with orthopedics regarding small 

phalanx avulsion fracture to her nondominant hand.  Discussed continue use of 

splint until healed and improved or otherwise directed by or so.  The symptoms 

to watch and return for, follow-up with family doctor as a precaution.  

Discussed diet and hydration, over-the-counter pain medications, she verbalized 

understanding was agreeable to plan.  I have a low suspicion for any additional 

occult traumatic injury.





Medication Reconcilliation


Current Medication List:  was personally reviewed by me





Blood Pressure Screening


Patient's blood pressure:  Normal blood pressure


Blood pressure disposition:  Did not require urgent referral





Impression





 Primary Impression:  


 Closed head injury


 Additional Impressions:  


 Facial abrasion


 Fracture of phalanx of finger





Scribe Attestation


The scribe's documentation has been prepared under my direction and personally 

reviewed by me in its entirety. I confirm that the note above accurately 

reflects all work, treatment, procedures, and medical decision making performed 

by me.





Departure Information


Dispostion


Home / Self-Care





Referrals


No Doctor, Assigned (PCP)





Forms


HOME CARE DOCUMENTATION FORM,                                                 

               IMPORTANT VISIT INFORMATION





Patient Instructions


My [facility]





Additional Instructions





Please wear the splint until your finger is healed and you are feeling better.  

Please have your family doctor or orthopedic surgeon recheck this in the 

interim.  Please stay well-hydrated.  You may use Tylenol or ibuprofen as 

needed for pain.  If you have any worsening or persistent headaches, develop 

nausea or vomiting, dizziness, worsening neck or back pain, numbness or tingling

, weakness in extremity, are unable to walk, or you have any other new or 

concerning symptoms, please return the emergency room.





Problem Qualifiers








 Primary Impression:  


 Closed head injury


 Encounter type:  initial encounter  Qualified Codes:  S09.90XA - Unspecified 

injury of head, initial encounter


 Additional Impressions:  


 Facial abrasion


 Encounter type:  initial encounter  Qualified Codes:  S00.81XA - Abrasion of 

other part of head, initial encounter


 Fracture of phalanx of finger


 Encounter type:  initial encounter  Finger:  middle finger  Fracture type:  

closed  Phalanx:  middle  Fracture alignment:  nondisplaced  Laterality:  right

  Qualified Codes:  S62.652A - Nondisplaced fracture of middle phalanx of right 

middle finger, initial encounter for closed fracture

## 2018-05-23 NOTE — DIAGNOSTIC IMAGING REPORT
CT OF THE HEAD WITHOUT CONTRAST



CLINICAL HISTORY: Trauma with head injury.    



COMPARISON STUDY:  No previous studies for comparison. 



CT DOSE: 958.01 mGy.cm



TECHNIQUE: Helical axial images of the head were obtained without IV contrast.

Automated exposure control was utilized for the study.  A dose lowering

technique was utilized adhering to the principles of ALARA.





FINDINGS: No acute intracranial hemorrhage, midline shift or mass effect is

present. Ventricular system is normal. Basilar cisterns are patent. There are no

extra-axial collections. Minimal white matter hypodensity suggests small vessel

disease. There is no calvarial fracture. A right maxillary sinus air-fluid level

is partially imaged on this exam. There may be a small left forehead contusion.



IMPRESSION:  



1. No acute intracranial findings.



2. No calvarial fracture.



3. Partially imaged right maxillary sinus air-fluid level. 







Electronically signed by:  Steven Price M.D.

5/23/2018 6:46 AM



Dictated Date/Time:  5/23/2018 6:42 AM

## 2018-05-23 NOTE — DIAGNOSTIC IMAGING REPORT
CERVICAL SPINE CT



CT DOSE:    



HISTORY:      trauma, pain at C7/T1



TECHNIQUE: Multiaxial CT images of the cervical spine were performed and

reformatted in the sagittal and coronal plane without the use of contrast.  A

dose lowering technique was utilized adhering to the principles of ALARA.



COMPARISON:  None.



FINDINGS: Straightening of the cervical spine. No acute fractures identified. C3

vertebral body hemangioma. 2 mm of anterolisthesis of C3 on C4. Moderate

degenerative disc disease with endplate osteophytes at C4-C5 and C5-C6. Small

partially detached osteophyte at the anterior inferior endplate of C5. This is

likely chronic. The spinous processes at C7 and T1 demonstrate nonunion at the

tips. This is likely due to old trauma or chronic change with calcification of

the nuchal ligament. Regardless, this is a nonacute finding. Prevertebral soft

tissues and the C1-C2 interval are intact. The lung apices are clear.



IMPRESSION:  



1. No acute fracture identified within the cervical spine.

2. Degenerative changes and chronic versus old posttraumatic changes as

described above.







Electronically signed by:  Qamar Ayala M.D.

5/23/2018 7:17 AM



Dictated Date/Time:  5/23/2018 7:10 AM

## 2018-05-23 NOTE — DIAGNOSTIC IMAGING REPORT
R FINGER(S) MIN 2 VIEWS ROUTINE



CLINICAL HISTORY: trauma, middle finger    



COMPARISON: None. 



FINDINGS:  Note is made of an acute nondisplaced fracture within the palmar base

of the middle phalanx of the right third finger. There are soft tissue swelling

at the level of the PIP joint of the right third finger. A ring on the fourth

finger is noted. There is severe arthritis of the right first carpometacarpal

joint.



IMPRESSION: Acute nondisplaced fracture within the palmar base of the middle

phalanx of the right third finger.







Electronically signed by:  Steven Price M.D.

5/23/2018 6:56 AM



Dictated Date/Time:  5/23/2018 6:48 AM